# Patient Record
Sex: FEMALE | URBAN - NONMETROPOLITAN AREA
[De-identification: names, ages, dates, MRNs, and addresses within clinical notes are randomized per-mention and may not be internally consistent; named-entity substitution may affect disease eponyms.]

---

## 2024-09-17 ENCOUNTER — OFFICE VISIT (OUTPATIENT)
Dept: URBAN - NONMETROPOLITAN AREA CLINIC 2 | Facility: CLINIC | Age: 29
End: 2024-09-17

## 2024-11-05 ENCOUNTER — DASHBOARD ENCOUNTERS (OUTPATIENT)
Age: 29
End: 2024-11-05

## 2024-11-05 ENCOUNTER — OFFICE VISIT (OUTPATIENT)
Dept: URBAN - NONMETROPOLITAN AREA CLINIC 2 | Facility: CLINIC | Age: 29
End: 2024-11-05
Payer: COMMERCIAL

## 2024-11-05 ENCOUNTER — TELEPHONE ENCOUNTER (OUTPATIENT)
Dept: URBAN - NONMETROPOLITAN AREA CLINIC 2 | Facility: CLINIC | Age: 29
End: 2024-11-05

## 2024-11-05 ENCOUNTER — LAB OUTSIDE AN ENCOUNTER (OUTPATIENT)
Dept: URBAN - NONMETROPOLITAN AREA CLINIC 2 | Facility: CLINIC | Age: 29
End: 2024-11-05

## 2024-11-05 VITALS
WEIGHT: 225 LBS | HEART RATE: 86 BPM | HEIGHT: 67 IN | DIASTOLIC BLOOD PRESSURE: 84 MMHG | BODY MASS INDEX: 35.31 KG/M2 | SYSTOLIC BLOOD PRESSURE: 115 MMHG

## 2024-11-05 DIAGNOSIS — K62.5 BLOOD PER RECTUM: ICD-10-CM

## 2024-11-05 DIAGNOSIS — K58.0 IRRITABLE BOWEL SYNDROME WITH DIARRHEA: ICD-10-CM

## 2024-11-05 PROBLEM — 197125005: Status: ACTIVE | Noted: 2024-11-05

## 2024-11-05 PROBLEM — 12063002: Status: ACTIVE | Noted: 2024-11-05

## 2024-11-05 PROBLEM — 398032003: Status: ACTIVE | Noted: 2024-11-05

## 2024-11-05 PROCEDURE — 99204 OFFICE O/P NEW MOD 45 MIN: CPT

## 2024-11-05 RX ORDER — DICYCLOMINE HYDROCHLORIDE 20 MG/1
1 TABLET TABLET ORAL THREE TIMES A DAY
Qty: 90 | OUTPATIENT
Start: 2024-11-05 | End: 2024-12-05

## 2024-11-05 NOTE — HPI-TODAY'S VISIT:
11/5/2024 July presents for evaluation of loose stools, chronic. Patient states loose stools are noted mostly post-prandial and observes floating stool in toilet, denies watery stool or incontinence. Years of duration, seems to be worsening.  Eating only "cheese bread" improves. Patient states soft/loose stools regularly. At times associated with abdominal cramping pain. Patient denies any specific food allergies.  Patient denies any recent travel or sick contacts. Patient denies any new medications, other than switching to Wellbutrin from Zoloft with no change in bowel habits with this.  Has lost 10 lbs  believes this is attributed to increased activity in lifestyle. Family history includes sister and mother post- CCY. She has used Pepto in past for social event and found this helpful. Noted some blood when wiping, suspecting hemorrhoids, denies pain. Some coughing overcoming sinus infection Pending allergist appt nothing for contraception, previous IUD expelled during coughing, no replacement Has 1x per week nocturnal BM urge Denies family history of colon cancer, IBD, celiac disease. Denies upper GI symptoms.  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 SP

## 2024-11-06 LAB
(TTG) AB, IGA: <1
(TTG) AB, IGG: <1
A/G RATIO: 1.6
ABSOLUTE BASOPHILS: 40
ABSOLUTE EOSINOPHILS: 59
ABSOLUTE LYMPHOCYTES: 1610
ABSOLUTE MONOCYTES: 310
ABSOLUTE NEUTROPHILS: 4580
ALBUMIN: 4.1
ALKALINE PHOSPHATASE: 73
ALT (SGPT): 20
ANTIGLIADIN ABS, IGA: 1.1
AST (SGOT): 18
BASOPHILS: 0.6
BILIRUBIN, TOTAL: 0.4
BUN/CREATININE RATIO: 8
BUN: 6
C-REACTIVE PROTEIN, QUANT: 6.1
CALCIUM: 9.1
CARBON DIOXIDE, TOTAL: 25
CHLORIDE: 105
CREATININE: 0.77
EGFR: 108
EOSINOPHILS: 0.9
GLIADIN (DEAMIDATED) AB (IGG): <1
GLOBULIN, TOTAL: 2.6
GLUCOSE: 91
HEMATOCRIT: 38.1
HEMOGLOBIN: 12.2
IMMUNOGLOBULIN A: 191
IMMUNOGLOBULIN A: 191
INTERPRETATION: (no result)
LYMPHOCYTES: 24.4
MCH: 26.8
MCHC: 32
MCV: 83.6
MONOCYTES: 4.7
MPV: 10.1
NEUTROPHILS: 69.4
PLATELET COUNT: 432
POTASSIUM: 4.4
PROTEIN, TOTAL: 6.7
RDW: 13.4
RED BLOOD CELL COUNT: 4.56
SED RATE BY MODIFIED: 6
SODIUM: 140
TISSUE TRANSGLUTAMINASE AB, IGA: <1
TSH W/REFLEX TO FT4: 2
WHITE BLOOD CELL COUNT: 6.6

## 2025-03-04 ENCOUNTER — OFFICE VISIT (OUTPATIENT)
Dept: URBAN - NONMETROPOLITAN AREA CLINIC 2 | Facility: CLINIC | Age: 30
End: 2025-03-04
Payer: COMMERCIAL

## 2025-03-04 VITALS
HEART RATE: 80 BPM | TEMPERATURE: 98 F | WEIGHT: 220 LBS | BODY MASS INDEX: 34.53 KG/M2 | DIASTOLIC BLOOD PRESSURE: 84 MMHG | SYSTOLIC BLOOD PRESSURE: 132 MMHG | HEIGHT: 67 IN

## 2025-03-04 DIAGNOSIS — K58.0 IRRITABLE BOWEL SYNDROME WITH DIARRHEA: ICD-10-CM

## 2025-03-04 DIAGNOSIS — R19.5 LOOSE STOOLS: ICD-10-CM

## 2025-03-04 DIAGNOSIS — K62.5 BLOOD PER RECTUM: ICD-10-CM

## 2025-03-04 PROCEDURE — 99213 OFFICE O/P EST LOW 20 MIN: CPT

## 2025-03-04 RX ORDER — RIFAXIMIN 550 MG/1
1 TABLET TABLET ORAL THREE TIMES A DAY
Qty: 42 TABLET | Refills: 0 | OUTPATIENT
Start: 2025-03-04 | End: 2025-03-18

## 2025-03-04 NOTE — HPI-TODAY'S VISIT:
11/5/2024 July presents for evaluation of loose stools, chronic. Patient states loose stools are noted mostly post-prandial and observes floating stool in toilet, denies watery stool or incontinence. Years of duration, seems to be worsening.  Eating only "cheese bread" improves. Patient states soft/loose stools regularly. At times associated with abdominal cramping pain. Patient denies any specific food allergies.  Patient denies any recent travel or sick contacts. Patient denies any new medications, other than switching to Wellbutrin from Zoloft with no change in bowel habits with this.  Has lost 10 lbs  believes this is attributed to increased activity in lifestyle. Family history includes sister and mother post- CCY. She has used Pepto in past for social event and found this helpful. Noted some blood when wiping, suspecting hemorrhoids, denies pain. Some coughing overcoming sinus infection Pending allergist appt nothing for contraception, previous IUD expelled during coughing, no replacement Has 1x per week nocturnal BM urge Denies family history of colon cancer, IBD, celiac disease. Denies upper GI symptoms.  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 SP  3/4/2025 Patient is here in follow up. She denies new complaints. She has  been taking the medications regularly. Is following Lifestyle and Dietary modifications as recommended . Continues with 2-3 Bms per day on average, Labs with celiac panel resulted normal.

## 2025-03-05 ENCOUNTER — TELEPHONE ENCOUNTER (OUTPATIENT)
Dept: URBAN - NONMETROPOLITAN AREA CLINIC 2 | Facility: CLINIC | Age: 30
End: 2025-03-05

## 2025-03-05 LAB
A/G RATIO: 1.7
ABSOLUTE BASOPHILS: 29
ABSOLUTE EOSINOPHILS: 73
ABSOLUTE LYMPHOCYTES: 1956
ABSOLUTE MONOCYTES: 489
ABSOLUTE NEUTROPHILS: 4752
ALBUMIN: 4.1
ALKALINE PHOSPHATASE: 76
ALT (SGPT): 14
AST (SGOT): 16
BASOPHILS: 0.4
BILIRUBIN, TOTAL: 0.3
BUN/CREATININE RATIO: (no result)
BUN: 8
CALCIUM: 9
CARBON DIOXIDE, TOTAL: 22
CHLORIDE: 105
CREATININE: 0.8
EGFR: 104
EOSINOPHILS: 1
GLOBULIN, TOTAL: 2.4
GLUCOSE: 84
HEMATOCRIT: 38.4
HEMOGLOBIN: 12.3
LYMPHOCYTES: 26.8
MCH: 26.8
MCHC: 32
MCV: 83.7
MONOCYTES: 6.7
MPV: 10.6
NEUTROPHILS: 65.1
PLATELET COUNT: 452
POTASSIUM: 4.3
PROTEIN, TOTAL: 6.5
RDW: 13.1
RED BLOOD CELL COUNT: 4.59
SODIUM: 137
WHITE BLOOD CELL COUNT: 7.3

## 2025-03-10 ENCOUNTER — TELEPHONE ENCOUNTER (OUTPATIENT)
Dept: URBAN - NONMETROPOLITAN AREA CLINIC 2 | Facility: CLINIC | Age: 30
End: 2025-03-10

## 2025-04-01 ENCOUNTER — TELEPHONE ENCOUNTER (OUTPATIENT)
Dept: URBAN - NONMETROPOLITAN AREA CLINIC 2 | Facility: CLINIC | Age: 30
End: 2025-04-01

## 2025-07-08 ENCOUNTER — LAB OUTSIDE AN ENCOUNTER (OUTPATIENT)
Dept: URBAN - NONMETROPOLITAN AREA CLINIC 2 | Facility: CLINIC | Age: 30
End: 2025-07-08

## 2025-07-08 ENCOUNTER — OFFICE VISIT (OUTPATIENT)
Dept: URBAN - NONMETROPOLITAN AREA CLINIC 2 | Facility: CLINIC | Age: 30
End: 2025-07-08
Payer: COMMERCIAL

## 2025-07-08 DIAGNOSIS — R19.5 LOOSE STOOLS: ICD-10-CM

## 2025-07-08 DIAGNOSIS — K58.0 IRRITABLE BOWEL SYNDROME WITH DIARRHEA: ICD-10-CM

## 2025-07-08 DIAGNOSIS — E61.1 IRON DEFICIENCY: ICD-10-CM

## 2025-07-08 DIAGNOSIS — K62.5 BLOOD PER RECTUM: ICD-10-CM

## 2025-07-08 DIAGNOSIS — D75.839 THROMBOCYTOSIS: ICD-10-CM

## 2025-07-08 PROCEDURE — 99214 OFFICE O/P EST MOD 30 MIN: CPT

## 2025-07-08 RX ORDER — COLESTIPOL HYDROCHLORIDE 1 G/1
1 TABLET TABLET, FILM COATED ORAL DAILY
Qty: 30 TABLET | Refills: 5 | OUTPATIENT
Start: 2025-07-08

## 2025-07-09 PROBLEM — 6631009: Status: ACTIVE | Noted: 2025-07-09

## 2025-07-09 PROBLEM — 35240004: Status: ACTIVE | Noted: 2025-07-09

## 2025-08-29 ENCOUNTER — OFFICE VISIT (OUTPATIENT)
Dept: URBAN - NONMETROPOLITAN AREA SURGERY CENTER 1 | Facility: SURGERY CENTER | Age: 30
End: 2025-08-29